# Patient Record
Sex: MALE | Race: WHITE
[De-identification: names, ages, dates, MRNs, and addresses within clinical notes are randomized per-mention and may not be internally consistent; named-entity substitution may affect disease eponyms.]

---

## 2019-02-18 ENCOUNTER — HOSPITAL ENCOUNTER (OUTPATIENT)
Dept: HOSPITAL 62 - OD | Age: 54
End: 2019-02-18
Attending: NURSE PRACTITIONER
Payer: COMMERCIAL

## 2019-02-18 DIAGNOSIS — R05: Primary | ICD-10-CM

## 2019-02-18 PROCEDURE — 71046 X-RAY EXAM CHEST 2 VIEWS: CPT

## 2019-02-18 NOTE — RADIOLOGY REPORT (SQ)
EXAM DESCRIPTION:  CHEST PA/LATERAL



COMPLETED DATE/TIME:  2/18/2019 3:46 pm



REASON FOR STUDY:  COUGH



COMPARISON:  None.



EXAM PARAMETERS:  NUMBER OF VIEWS: two views

TECHNIQUE: Digital Frontal and Lateral radiographic views of the chest acquired.

RADIATION DOSE: NA

LIMITATIONS: none



FINDINGS:  LUNGS AND PLEURA: No opacities, masses or pneumothorax. No pleural effusion.

MEDIASTINUM AND HILAR STRUCTURES: No masses or contour abnormalities.

HEART AND VASCULAR STRUCTURES: Heart normal size.  No evidence for failure.

BONES: No acute findings.

HARDWARE: None in the chest.

OTHER: No other significant finding.



IMPRESSION:  NO SIGNIFICANT RADIOGRAPHIC FINDING IN THE CHEST.



TECHNICAL DOCUMENTATION:  JOB ID:  [id number]

 2011 [employer]- All Rights Reserved



Reading location - IP/workstation name: [email or web address]

## 2020-06-13 ENCOUNTER — HOSPITAL ENCOUNTER (OUTPATIENT)
Dept: HOSPITAL 62 - RAD | Age: 55
End: 2020-06-13
Attending: OTOLARYNGOLOGY
Payer: COMMERCIAL

## 2020-06-13 DIAGNOSIS — E07.89: Primary | ICD-10-CM

## 2020-06-13 DIAGNOSIS — J32.9: ICD-10-CM

## 2020-06-13 PROCEDURE — 70486 CT MAXILLOFACIAL W/O DYE: CPT

## 2020-06-13 NOTE — RADIOLOGY REPORT (SQ)
EXAM DESCRIPTION:  CT SINUSES FOR ENT



IMAGES COMPLETED DATE/TIME:  6/13/2020 11:11 am



REASON FOR STUDY:  J32.9 CHRONIC SINUSITIS, UNSPECIFIED J32.9  CHRONIC SINUSITIS, UNSPECIFIED.  Chron
ic sinusitis.  Thyroid fullness.



COMPARISON:  None.



TECHNIQUE:  Noncontrast scanning through the paranasal sinuses using bone algorithm.   Reconstructed 
MPR images reviewed.  All images stored on PACS.

All CT scanners at this facility use dose modulation, iterative reconstruction, and/or weight based d
osing when appropriate to reduce radiation dose to as low as reasonably achievable (ALARA).

[facility]: Dose Right  [facility]: CareDose    [facility]: Dose Right    [facility]: Teradose 4D    [facility]: Smart Technologies



RADIATION DOSE:  mGy.



LIMITATIONS:  None.



FINDINGS:  Right sinuses and drainage pathways:

Post-surgical changes: None.

Frontal sinus:  Normal.

Frontoethmoidal Recess:  Normal.

Anterior Ethmoid Sinuses:  Normal.

Posterior Ethmoid Sinuses:  Normal.

Sphenoid Sinus: Normal.

Sphenoethmoidal Recess:  Normal.

Maxillary Sinus:  Normal.

Ostiomeatal Complex:  Normal.

Left Sinuses and Drainage Pathways:

Post-Surgical Changes:  None.

Frontal Sinus:  Normal.

Frontoethmoidal Recess:  Normal.

Anterior Ethmoid Sinuses:  Normal.

Posterior Ethmoid Sinuses:  Normal.

Sphenoid Sinus:  Normal.

Sphenoethmoidal Recess:  Normal.

Maxillary Sinus:  Normal.

Ostiomeatal Complex:  Normal.

Right Olfactory Fossa:  No polyps.

Left Olfactory Fossa:  No polyps.

Middle Turbinate Matilde Bullosa:  No.

Paradoxical Middle Turbinate:  No.

Atelectatic Uncinated Process:  No.

Frontal Vikki Cell Type I:  No.

Frontal Vikki Cell Type II:  No.

Interfrontal Sinus Septal Cell:  None.

Supra-Orbital Ethmoid:  None.

Frontal Bullar Cell:  None.

Suprabullar Bullar Cell:  None.

Sphenoethmoidal (Onodi) Cell:  None.

Pneumatization of the Anterior Clinoid Processes:

Hypoplastic Maxillary Sinus:  None.

Osteoneogenesis: None.

Bone Dehiscence:None.

Nasal Cavity:  Normal.

Nasal Septum:  Midline

Anatomic Variants:

Right Vidian Canal:  Normal.

Left Vidian Canal:  Normal.



IMPRESSION:  NO EVIDENCE OF ACUTE OR CHRONIC SINUSITIS.



TECHNICAL DOCUMENTATION:  JOB ID:  [id number]

Quality ID # [id number]: Final reports with documentation of one or more dose reduction techniques (e.g., Au
tomated exposure control, adjustment of the mA and/or kV according to patient size, use of iterative 
reconstruction technique)

 2011 [employer]- All Rights Reserved



Reading location - IP/workstation name: [email or web address]

## 2020-06-15 ENCOUNTER — HOSPITAL ENCOUNTER (OUTPATIENT)
Dept: HOSPITAL 62 - RAD | Age: 55
End: 2020-06-15
Attending: OTOLARYNGOLOGY
Payer: COMMERCIAL

## 2020-06-15 DIAGNOSIS — E07.89: ICD-10-CM

## 2020-06-15 DIAGNOSIS — J32.9: Primary | ICD-10-CM

## 2020-06-15 PROCEDURE — 76536 US EXAM OF HEAD AND NECK: CPT

## 2020-06-15 NOTE — RADIOLOGY REPORT (SQ)
EXAM DESCRIPTION:  U/S THYROID/SFT TISS HD   NECK



IMAGES COMPLETED DATE/TIME:  6/15/2020 5:28 pm



REASON FOR STUDY:  E07.89 OTHER SPECIFIED DISORDERS OF THYROID E07.89  OTHER SPECIFIED DISORDERS OF T
HYROID



COMPARISON:  None.



TECHNIQUE:  Dynamic and static gray-scale images acquired of the thyroid gland. Selected additional c
olor/power Doppler images recorded.  All images stored to PACS.



LIMITATIONS:  None.



FINDINGS:  RIGHT LOBE:  The right lobe of the thyroid gland measures 4.1 x 1.3 x 1.5 cm, normal size.
  Homogeneous echotexture.  A small complex 3 x 3 x 1 mm nodule in the mid pole.

LEFT LOBE:  The left lobe measures 3.3 x 1.5 x 1.6 cm, normal size.  Homogeneous echotexture.  No cys
tic or solid masses.

ISTHMUS:  The isthmus measures 4.6 mm in AP diameter, upper limits normal. Homogeneous echotexture.  
No cystic or solid masses.

OTHER: No other significant finding.



IMPRESSION:  1.  Small subcentimeter nodule right lobe of the thyroid gland.



COMMENT:   RECOMMENDATIONS FOR THYROID NODULES 1 CM OR LARGER

Solitary nodules:

Microcalcifications - FNA if 1 cm or greater.

Solid or coarse calcification - FNA if 1.5 cm or greater.

Mixed Solid/Cystic or Cystic with Mural Nodule - FNA if 2 cm or greater.

None of the above but substantial growth since previous - FNA.

Cystic with none of the above features and no significant growth - no FNA.

Multiple nodules:

Use above criteria for selection of nodules to FNA/biopsy.

Biopsy probably not necessary in enlarged gland with multiple nodules of similar appearance.

Abnormal lymph nodes - FNA/biopsy.

Reference:

Management of Thyroid Nodules Detected at [place]: Society of Radiologists in Ultrasound Consensus Stateme
nt. Radiology 2005; 237:794-800



TECHNICAL DOCUMENTATION:  JOB ID:  [id number]

 2011 [employer]- All Rights Reserved



Reading location - IP/workstation name: [email or web address]

## 2020-06-16 ENCOUNTER — HOSPITAL ENCOUNTER (OUTPATIENT)
Dept: HOSPITAL 62 - OD | Age: 55
End: 2020-06-16
Attending: OTOLARYNGOLOGY
Payer: COMMERCIAL

## 2020-06-16 DIAGNOSIS — J32.9: Primary | ICD-10-CM

## 2020-06-16 PROCEDURE — 86003 ALLG SPEC IGE CRUDE XTRC EA: CPT

## 2020-06-16 PROCEDURE — 36415 COLL VENOUS BLD VENIPUNCTURE: CPT

## 2020-06-16 PROCEDURE — 82785 ASSAY OF IGE: CPT
